# Patient Record
Sex: FEMALE | URBAN - METROPOLITAN AREA
[De-identification: names, ages, dates, MRNs, and addresses within clinical notes are randomized per-mention and may not be internally consistent; named-entity substitution may affect disease eponyms.]

---

## 2023-08-15 ENCOUNTER — APPOINTMENT (RX ONLY)
Dept: URBAN - METROPOLITAN AREA CLINIC 22 | Facility: CLINIC | Age: 73
Setting detail: DERMATOLOGY
End: 2023-08-15

## 2023-08-15 DIAGNOSIS — L82.1 OTHER SEBORRHEIC KERATOSIS: ICD-10-CM

## 2023-08-15 DIAGNOSIS — Z71.89 OTHER SPECIFIED COUNSELING: ICD-10-CM

## 2023-08-15 DIAGNOSIS — K13.21 LEUKOPLAKIA OF ORAL MUCOSA, INCLUDING TONGUE: ICD-10-CM

## 2023-08-15 DIAGNOSIS — D22 MELANOCYTIC NEVI: ICD-10-CM

## 2023-08-15 DIAGNOSIS — L82.0 INFLAMED SEBORRHEIC KERATOSIS: ICD-10-CM

## 2023-08-15 DIAGNOSIS — L81.4 OTHER MELANIN HYPERPIGMENTATION: ICD-10-CM

## 2023-08-15 DIAGNOSIS — D18.0 HEMANGIOMA: ICD-10-CM

## 2023-08-15 DIAGNOSIS — L91.8 OTHER HYPERTROPHIC DISORDERS OF THE SKIN: ICD-10-CM

## 2023-08-15 PROBLEM — D18.01 HEMANGIOMA OF SKIN AND SUBCUTANEOUS TISSUE: Status: ACTIVE | Noted: 2023-08-15

## 2023-08-15 PROBLEM — D22.5 MELANOCYTIC NEVI OF TRUNK: Status: ACTIVE | Noted: 2023-08-15

## 2023-08-15 PROCEDURE — ? LIQUID NITROGEN

## 2023-08-15 PROCEDURE — ? ADDITIONAL NOTES

## 2023-08-15 PROCEDURE — ? SUNSCREEN RECOMMENDATIONS

## 2023-08-15 PROCEDURE — ? COUNSELING

## 2023-08-15 PROCEDURE — 17110 DESTRUCTION B9 LES UP TO 14: CPT

## 2023-08-15 PROCEDURE — 99203 OFFICE O/P NEW LOW 30 MIN: CPT | Mod: 25

## 2023-08-15 ASSESSMENT — LOCATION SIMPLE DESCRIPTION DERM
LOCATION SIMPLE: RIGHT UPPER BACK
LOCATION SIMPLE: RIGHT FOREARM
LOCATION SIMPLE: MONS PUBIS
LOCATION SIMPLE: LEFT SHOULDER
LOCATION SIMPLE: ABDOMEN
LOCATION SIMPLE: LEFT ANTERIOR AXILLA
LOCATION SIMPLE: LEFT UPPER BACK
LOCATION SIMPLE: LEFT LOWER LIP
LOCATION SIMPLE: LEFT POSTERIOR AXILLA
LOCATION SIMPLE: LEFT FOREARM
LOCATION SIMPLE: RIGHT ANTERIOR AXILLA
LOCATION SIMPLE: LEFT AXILLARY VAULT

## 2023-08-15 ASSESSMENT — LOCATION DETAILED DESCRIPTION DERM
LOCATION DETAILED: RIGHT MID-UPPER BACK
LOCATION DETAILED: LEFT INFERIOR VERMILION BORDER
LOCATION DETAILED: LEFT POSTERIOR SHOULDER
LOCATION DETAILED: LEFT INFERIOR UPPER BACK
LOCATION DETAILED: LEFT AXILLARY VAULT
LOCATION DETAILED: RIGHT ANTERIOR AXILLA
LOCATION DETAILED: LEFT POSTERIOR AXILLA
LOCATION DETAILED: LEFT ANTERIOR AXILLA
LOCATION DETAILED: LEFT RIB CAGE
LOCATION DETAILED: LEFT PROXIMAL DORSAL FOREARM
LOCATION DETAILED: RIGHT MEDIAL UPPER BACK
LOCATION DETAILED: LEFT MONS PUBIS
LOCATION DETAILED: LEFT MEDIAL UPPER BACK
LOCATION DETAILED: RIGHT PROXIMAL DORSAL FOREARM
LOCATION DETAILED: LEFT LATERAL ABDOMEN
LOCATION DETAILED: LEFT MID-UPPER BACK

## 2023-08-15 ASSESSMENT — LOCATION ZONE DERM
LOCATION ZONE: AXILLAE
LOCATION ZONE: ARM
LOCATION ZONE: TRUNK
LOCATION ZONE: LIP
LOCATION ZONE: VULVA

## 2023-08-15 NOTE — PROCEDURE: LIQUID NITROGEN
Show Topical Anesthesia Variable?: Yes
Post-Care Instructions: I reviewed with the patient in detail post-care instructions. Patient is to wear sunprotection, and avoid picking at any of the treated lesions. Pt may apply Vaseline to crusted or scabbing areas.
Medical Necessity Information: It is in your best interest to select a reason for this procedure from the list below. All of these items fulfill various CMS LCD requirements except the new and changing color options.
Consent: The patient's mom’s consent was obtained including but not limited to risks of crusting, scabbing, blistering, scarring, darker or lighter pigmentary change, recurrence, incomplete removal and infection.
Spray Paint Technique: No
Spray Paint Text: The liquid nitrogen was applied to the skin utilizing a spray paint frosting technique.
Medical Necessity Clause: This procedure was medically necessary because the lesions that were treated were:
Detail Level: Detailed
Duration Of Freeze Thaw-Cycle (Seconds): 0
Consent: The patient's consent was obtained including but not limited to risks of crusting, scabbing, blistering, scarring, darker or lighter pigmentary change, recurrence, incomplete removal and infection.

## 2023-08-15 NOTE — PROCEDURE: ADDITIONAL NOTES
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  Questioned if she had been also given a dx of lichen sclerosus but states this did not sound familiar. She states it was managed by GYN when she was in [place] and is working on getting a new one here in [facility].  She declines an exam of this area for now however instructed that if she has any concerns we are happy to examine and address this.
Detail Level: Simple
Render Risk Assessment In Note?: no

## 2023-11-06 ENCOUNTER — HOSPITAL ENCOUNTER (OUTPATIENT)
Dept: LAB | Facility: MEDICAL CENTER | Age: 73
End: 2023-11-06
Attending: FAMILY MEDICINE
Payer: COMMERCIAL

## 2023-11-06 LAB
ALBUMIN SERPL BCP-MCNC: 4.2 G/DL (ref 3.2–4.9)
ALBUMIN/GLOB SERPL: 1.6 G/DL
ALP SERPL-CCNC: 108 U/L (ref 30–99)
ALT SERPL-CCNC: 12 U/L (ref 2–50)
ANION GAP SERPL CALC-SCNC: 8 MMOL/L (ref 7–16)
AST SERPL-CCNC: 13 U/L (ref 12–45)
BASOPHILS # BLD AUTO: 0.3 % (ref 0–1.8)
BASOPHILS # BLD: 0.02 K/UL (ref 0–0.12)
BILIRUB SERPL-MCNC: 0.3 MG/DL (ref 0.1–1.5)
BUN SERPL-MCNC: 22 MG/DL (ref 8–22)
CALCIUM ALBUM COR SERPL-MCNC: 9.2 MG/DL (ref 8.5–10.5)
CALCIUM SERPL-MCNC: 9.4 MG/DL (ref 8.5–10.5)
CHLORIDE SERPL-SCNC: 103 MMOL/L (ref 96–112)
CHOLEST SERPL-MCNC: 180 MG/DL (ref 100–199)
CO2 SERPL-SCNC: 29 MMOL/L (ref 20–33)
CREAT SERPL-MCNC: 0.76 MG/DL (ref 0.5–1.4)
EOSINOPHIL # BLD AUTO: 0.18 K/UL (ref 0–0.51)
EOSINOPHIL NFR BLD: 3 % (ref 0–6.9)
ERYTHROCYTE [DISTWIDTH] IN BLOOD BY AUTOMATED COUNT: 41.1 FL (ref 35.9–50)
FASTING STATUS PATIENT QL REPORTED: NORMAL
GFR SERPLBLD CREATININE-BSD FMLA CKD-EPI: 84 ML/MIN/1.73 M 2
GLOBULIN SER CALC-MCNC: 2.7 G/DL (ref 1.9–3.5)
GLUCOSE SERPL-MCNC: 97 MG/DL (ref 65–99)
HCT VFR BLD AUTO: 45.5 % (ref 37–47)
HDLC SERPL-MCNC: 63 MG/DL
HGB BLD-MCNC: 15.2 G/DL (ref 12–16)
IMM GRANULOCYTES # BLD AUTO: 0.03 K/UL (ref 0–0.11)
IMM GRANULOCYTES NFR BLD AUTO: 0.5 % (ref 0–0.9)
LDLC SERPL CALC-MCNC: 93 MG/DL
LYMPHOCYTES # BLD AUTO: 2.05 K/UL (ref 1–4.8)
LYMPHOCYTES NFR BLD: 33.8 % (ref 22–41)
MCH RBC QN AUTO: 30.5 PG (ref 27–33)
MCHC RBC AUTO-ENTMCNC: 33.4 G/DL (ref 32.2–35.5)
MCV RBC AUTO: 91.2 FL (ref 81.4–97.8)
MONOCYTES # BLD AUTO: 0.41 K/UL (ref 0–0.85)
MONOCYTES NFR BLD AUTO: 6.8 % (ref 0–13.4)
NEUTROPHILS # BLD AUTO: 3.37 K/UL (ref 1.82–7.42)
NEUTROPHILS NFR BLD: 55.6 % (ref 44–72)
NRBC # BLD AUTO: 0 K/UL
NRBC BLD-RTO: 0 /100 WBC (ref 0–0.2)
PLATELET # BLD AUTO: 310 K/UL (ref 164–446)
PMV BLD AUTO: 8.7 FL (ref 9–12.9)
POTASSIUM SERPL-SCNC: 4.3 MMOL/L (ref 3.6–5.5)
PROT SERPL-MCNC: 6.9 G/DL (ref 6–8.2)
RBC # BLD AUTO: 4.99 M/UL (ref 4.2–5.4)
SODIUM SERPL-SCNC: 140 MMOL/L (ref 135–145)
TRIGL SERPL-MCNC: 120 MG/DL (ref 0–149)
WBC # BLD AUTO: 6.1 K/UL (ref 4.8–10.8)

## 2023-11-06 PROCEDURE — 85025 COMPLETE CBC W/AUTO DIFF WBC: CPT

## 2023-11-06 PROCEDURE — 80053 COMPREHEN METABOLIC PANEL: CPT

## 2023-11-06 PROCEDURE — 36415 COLL VENOUS BLD VENIPUNCTURE: CPT

## 2023-11-06 PROCEDURE — 80061 LIPID PANEL: CPT

## 2024-03-27 ENCOUNTER — OFFICE VISIT (OUTPATIENT)
Dept: MEDICAL GROUP | Facility: PHYSICIAN GROUP | Age: 74
End: 2024-03-27
Payer: MEDICARE

## 2024-03-27 ENCOUNTER — HOSPITAL ENCOUNTER (OUTPATIENT)
Dept: LAB | Facility: MEDICAL CENTER | Age: 74
End: 2024-03-27
Attending: STUDENT IN AN ORGANIZED HEALTH CARE EDUCATION/TRAINING PROGRAM
Payer: MEDICARE

## 2024-03-27 VITALS
HEART RATE: 62 BPM | WEIGHT: 151 LBS | OXYGEN SATURATION: 97 % | BODY MASS INDEX: 27.79 KG/M2 | HEIGHT: 62 IN | TEMPERATURE: 97.7 F | DIASTOLIC BLOOD PRESSURE: 70 MMHG | SYSTOLIC BLOOD PRESSURE: 120 MMHG

## 2024-03-27 DIAGNOSIS — K63.5 BENIGN COLONIC POLYP: ICD-10-CM

## 2024-03-27 DIAGNOSIS — I10 ESSENTIAL HYPERTENSION: ICD-10-CM

## 2024-03-27 DIAGNOSIS — Z12.11 COLON CANCER SCREENING: ICD-10-CM

## 2024-03-27 DIAGNOSIS — M85.80 OSTEOPENIA AFTER MENOPAUSE: ICD-10-CM

## 2024-03-27 DIAGNOSIS — Z76.89 ENCOUNTER TO ESTABLISH CARE: ICD-10-CM

## 2024-03-27 DIAGNOSIS — L40.9 PSORIASIS: ICD-10-CM

## 2024-03-27 DIAGNOSIS — H93.A3 PULSATILE TINNITUS OF BOTH EARS: ICD-10-CM

## 2024-03-27 DIAGNOSIS — H93.13 TINNITUS, BILATERAL: ICD-10-CM

## 2024-03-27 DIAGNOSIS — N90.4 LEUKOPLAKIA OF VULVA: ICD-10-CM

## 2024-03-27 DIAGNOSIS — G44.89 OTHER HEADACHE SYNDROME: ICD-10-CM

## 2024-03-27 DIAGNOSIS — I45.10 RBBB: ICD-10-CM

## 2024-03-27 DIAGNOSIS — Z78.0 OSTEOPENIA AFTER MENOPAUSE: ICD-10-CM

## 2024-03-27 DIAGNOSIS — M25.532 LEFT WRIST PAIN: ICD-10-CM

## 2024-03-27 PROBLEM — H69.91 ETD (EUSTACHIAN TUBE DYSFUNCTION), RIGHT: Status: RESOLVED | Noted: 2022-04-01 | Resolved: 2024-03-27

## 2024-03-27 PROBLEM — M89.319 CLAVICLE ENLARGEMENT: Status: ACTIVE | Noted: 2022-04-01

## 2024-03-27 PROBLEM — H93.A2 PULSATILE TINNITUS OF LEFT EAR: Status: ACTIVE | Noted: 2024-03-27

## 2024-03-27 PROBLEM — H69.91 ETD (EUSTACHIAN TUBE DYSFUNCTION), RIGHT: Status: ACTIVE | Noted: 2022-04-01

## 2024-03-27 PROBLEM — M89.319 CLAVICLE ENLARGEMENT: Status: RESOLVED | Noted: 2022-04-01 | Resolved: 2024-03-27

## 2024-03-27 PROCEDURE — 80048 BASIC METABOLIC PNL TOTAL CA: CPT

## 2024-03-27 PROCEDURE — 36415 COLL VENOUS BLD VENIPUNCTURE: CPT

## 2024-03-27 RX ORDER — CLOBETASOL PROPIONATE 0.5 MG/G
1 CREAM TOPICAL 2 TIMES DAILY
COMMUNITY

## 2024-03-27 RX ORDER — VALSARTAN AND HYDROCHLOROTHIAZIDE 80; 12.5 MG/1; MG/1
1 TABLET, FILM COATED ORAL
COMMUNITY
Start: 2024-01-15

## 2024-03-27 ASSESSMENT — ENCOUNTER SYMPTOMS
SHORTNESS OF BREATH: 0
FEVER: 0
PALPITATIONS: 0
CHILLS: 0

## 2024-03-27 ASSESSMENT — PATIENT HEALTH QUESTIONNAIRE - PHQ9: CLINICAL INTERPRETATION OF PHQ2 SCORE: 0

## 2024-03-27 ASSESSMENT — FIBROSIS 4 INDEX: FIB4 SCORE: 0.86

## 2024-03-27 NOTE — LETTER
[facility]  Iman Novak M.D.  [address] [place] [address]  Fax: [phone]   Authorization for Release/Disclosure of   Protected Health Information   Name: MALCOM VALLES : 1950 SSN: [id number]   Address: [facility] [place] [address] Phone:    [phone] (home)    I authorize the entity listed below to release/disclose the PHI below to:   [facility]/Iman Novak M.D. and Iman Novak M.D.   Provider or Entity Name:  [facility] Medicine: Yuniel Guy MD   Address   [facility], [place]   [address], [place] [address] Phone:  [phone]    Fax:     Reason for request: continuity of care   Information to be released:    [  ] LAST COLONOSCOPY,  including any PATH REPORT and follow-up  [  ] LAST FIT/COLOGUARD RESULT [  ] LAST DEXA  [  ] LAST MAMMOGRAM  [  ] LAST PAP  [  ] LAST LABS [  ] RETINA EXAM REPORT  [  ] IMMUNIZATION RECORDS  [xxxxx] Release all info      [  ] Check here and initial the line next to each item to release ALL health information INCLUDING  _____ Care and treatment for drug and / or alcohol abuse  _____ HIV testing, infection status, or AIDS  _____ Genetic Testing    DATES OF SERVICE OR TIME PERIOD TO BE DISCLOSED: _____________  I understand and acknowledge that:  * This Authorization may be revoked at any time by you in writing, except if your health information has already been used or disclosed.  * Your health information that will be used or disclosed as a result of you signing this authorization could be re-disclosed by the recipient. If this occurs, your re-disclosed health information may no longer be protected by State or Federal laws.  * You may refuse to sign this Authorization. Your refusal will not affect your ability to obtain treatment.  * This Authorization becomes effective upon signing and will  on (date) __________.      If no date is indicated, this Authorization will  one (1) year from the signature date.    Name:  Christa Blankenship  Signature: Date:   3/27/2024     PLEASE FAX REQUESTED RECORDS BACK TO: [phone]

## 2024-03-27 NOTE — ASSESSMENT & PLAN NOTE
Chronic, patient was previously established with Cardiology and was following with them annually. She denies chest pain and dyspnea.

## 2024-03-27 NOTE — ASSESSMENT & PLAN NOTE
Patient reports that about three months ago she noticed as sensation of aching in her left occipital region. Episodes are intermittent and last about five minutes. She feels as if there is bruise there. She denies numbness/tingling.  She does not wake up with headaches, denies nausea/vomiting, no changes in the vision. Laying on her left side and touching the area can cause the sensation. Family history of cerebellar hemorrhage in father.

## 2024-03-27 NOTE — ASSESSMENT & PLAN NOTE
Patient reports that this has been ongoing for the past three months. She will hear her heart beating when laying on her side.

## 2024-03-27 NOTE — PROGRESS NOTES
Subjective:     CC:  Diagnoses of Encounter to establish care, Other headache syndrome, Tinnitus, bilateral, Pulsatile tinnitus of both ears, Essential hypertension, RBBB, Psoriasis, Osteopenia after menopause, Leukoplakia of vulva, Left wrist pain, Benign colonic polyp, and Colon cancer screening were pertinent to this visit.    HISTORY OF THE PRESENT ILLNESS: Patient is a 73 y.o. female. This pleasant patient is here today to establish care.    Essential hypertension  Chronic, controlled. Patient currently on Diovan-HCT 80-12.5mg qd.    Psoriasis  Chronic, stable. Patient is established with dermatology.     Leukoplakia of vulva  Chronic, stable. Patient takes Clobetasol propionate cream 0.05%.     RBBB  Chronic, patient was previously established with Cardiology and was following with them annually. She denies chest pain and dyspnea.     Benign colonic polyp  Patient last had a colonoscopy in 5/2019 with one polyp removed.     Osteopenia after menopause  Patient last had a dexa scan in 2021 with findings of osteopenia.     Other headache syndrome  Patient reports that about three months ago she noticed as sensation of aching in her left occipital region. Episodes are intermittent and last about five minutes. She feels as if there is bruise there. She denies numbness/tingling.  She does not wake up with headaches, denies nausea/vomiting, no changes in the vision. Laying on her left side and touching the area can cause the sensation. Family history of cerebellar hemorrhage in father.     Pulsatile tinnitus of both ears  Patient reports that this has been ongoing for the past three months. She will hear her heart beating when laying on her side.     Left wrist pain  Patient reports having left wrist pain that began about two weeks ago. She has been wearing a brace with some relief. She has been applying ice and resting. She reports the pain is improving however she is still unable to  her grandchildern. She  "has a history of left wrist cyst removal in 10/2020 and trigger thumb release in 10/2019. Patient requesting referral to Ortho.       Patient Active Problem List    Diagnosis Date Noted    Psoriasis 03/27/2024    Leukoplakia of vulva 03/27/2024    Other headache syndrome 03/27/2024    Pulsatile tinnitus of both ears 03/27/2024    Left wrist pain 03/27/2024    Osteopenia after menopause 09/02/2021    Essential hypertension 10/20/2020    RBBB 10/20/2020    Benign colonic polyp 12/03/2007       Health Maintenance: Completed  -Records requested from patient previous PCP    ROS:   Review of Systems   Constitutional:  Negative for chills and fever.   Respiratory:  Negative for shortness of breath.    Cardiovascular:  Negative for chest pain and palpitations.         Objective:     Exam: /70 (BP Location: Right arm, Patient Position: Sitting, BP Cuff Size: Adult)   Pulse 62   Temp 36.5 °C (97.7 °F) (Temporal)   Ht 1.575 m (5' 2\")   Wt 68.5 kg (151 lb)   SpO2 97%  Body mass index is 27.62 kg/m².    Physical Exam  Constitutional:       Appearance: Normal appearance.   Cardiovascular:      Rate and Rhythm: Normal rate and regular rhythm.      Heart sounds: Normal heart sounds.   Pulmonary:      Effort: Pulmonary effort is normal. No respiratory distress.      Breath sounds: Normal breath sounds. No stridor. No wheezing, rhonchi or rales.   Neurological:      General: No focal deficit present.      Mental Status: She is alert.      Cranial Nerves: Cranial nerves 2-12 are intact.      Sensory: Sensation is intact.      Motor: Motor function is intact.      Coordination: Coordination is intact.      Gait: Gait is intact.           Assessment & Plan:   73 y.o. female with the following -    1. Encounter to establish care  Patient presents today to establish care. Chart was reviewed and history was discussed in detail with the patient.  Records requested from patient's previous PCP.  Referral for colonoscopy " placed.    2. Other headache syndrome  Chronic, ongoing for the past 3 months. Unclear etiology at this time, symptoms could possibly be related to bilateral pulsatile tinnitus.  Neuroexam wnl.  MRI brain with and without contrast ordered.    3. Tinnitus, bilateral  4. Pulsatile tinnitus of both ears  Chronic, ongoing for the past 3 months.  Will obtain MR brain with and without contrast and BMP.  Referral for ENT placed.  - MR-BRAIN-WITH & W/O; Future  - Basic Metabolic Panel; Future  - Referral to ENT    5. Essential hypertension  Chronic, controlled.  Blood pressure is at goal under 140/90 in the office today. Continue Diovan-HCT 80-12.5 mg qd.    6. RBBB  Chronic, stable condition.  Patient previously established with cardiology.  At the next visit we will place referral for cardiology.    7. Psoriasis  Chronic, stable condition.  Continue Clobetasol cream PRN.    8. Osteopenia after menopause  Chronic, stable condition.  DEXA scan from 9/2021 will provide order for DEXA scan at the next visit.    9. Leukoplakia of vulva  Chronic, stable condition.  Continue clobetasol cream PRN.    10. Left wrist pain  Chronic, ongoing.  Patient requesting referral to orthopedics.  Referral placed today.  - Referral to Orthopedics    11. Benign colonic polyp  12. Colon cancer screening  - Referral to GI for Colonoscopy          Return in about 8 weeks (around 5/22/2024) for Chronic Conditions.    Please note that this dictation was created using voice recognition software. I have made every reasonable attempt to correct obvious errors, but I expect that there are errors of grammar and possibly content that I did not discover before finalizing the note.

## 2024-03-27 NOTE — ASSESSMENT & PLAN NOTE
Patient reports having left wrist pain that began about two weeks ago. She has been wearing a brace with some relief. She has been applying ice and resting. She reports the pain is improving however she is still unable to  her grandchildern. She has a history of left wrist cyst removal in 10/2020 and trigger thumb release in 10/2019. Patient requesting referral to Ortho.

## 2024-03-28 LAB
ANION GAP SERPL CALC-SCNC: 11 MMOL/L (ref 7–16)
BUN SERPL-MCNC: 19 MG/DL (ref 8–22)
CALCIUM SERPL-MCNC: 9.4 MG/DL (ref 8.5–10.5)
CHLORIDE SERPL-SCNC: 101 MMOL/L (ref 96–112)
CO2 SERPL-SCNC: 27 MMOL/L (ref 20–33)
CREAT SERPL-MCNC: 0.65 MG/DL (ref 0.5–1.4)
GFR SERPLBLD CREATININE-BSD FMLA CKD-EPI: 94 ML/MIN/1.73 M 2
GLUCOSE SERPL-MCNC: 91 MG/DL (ref 65–99)
POTASSIUM SERPL-SCNC: 4.8 MMOL/L (ref 3.6–5.5)
SODIUM SERPL-SCNC: 139 MMOL/L (ref 135–145)

## 2024-04-23 RX ORDER — VALSARTAN AND HYDROCHLOROTHIAZIDE 80; 12.5 MG/1; MG/1
1 TABLET, FILM COATED ORAL
Qty: 90 TABLET | Refills: 0 | Status: SHIPPED | OUTPATIENT
Start: 2024-04-23

## 2024-04-23 NOTE — TELEPHONE ENCOUNTER
Requested Prescriptions     Pending Prescriptions Disp Refills    valsartan-hydrochlorothiazide (DIOVAN-HCT) 80-12.5 MG per tablet 90 Tablet 0     Sig: Take 1 Tablet by mouth every day.        Statement Selected

## 2024-04-23 NOTE — TELEPHONE ENCOUNTER
Received request via: Patient    Was the patient seen in the last year in this department? Yes    Does the patient have an active prescription (recently filled or refills available) for medication(s) requested? No    [facility]    Does the patient have [facility] and need 100 day supply (blood pressure, diabetes and cholesterol meds only)? Patient does not have SCP

## 2024-05-21 ENCOUNTER — OFFICE VISIT (OUTPATIENT)
Dept: MEDICAL GROUP | Facility: PHYSICIAN GROUP | Age: 74
End: 2024-05-21
Payer: MEDICARE

## 2024-05-21 VITALS
HEART RATE: 97 BPM | HEIGHT: 62 IN | TEMPERATURE: 98 F | BODY MASS INDEX: 27.6 KG/M2 | OXYGEN SATURATION: 97 % | DIASTOLIC BLOOD PRESSURE: 72 MMHG | WEIGHT: 150 LBS | SYSTOLIC BLOOD PRESSURE: 120 MMHG

## 2024-05-21 DIAGNOSIS — I10 ESSENTIAL HYPERTENSION: ICD-10-CM

## 2024-05-21 DIAGNOSIS — N90.4 LEUKOPLAKIA OF VULVA: ICD-10-CM

## 2024-05-21 DIAGNOSIS — L40.9 PSORIASIS: ICD-10-CM

## 2024-05-21 DIAGNOSIS — K63.5 POLYP OF SIGMOID COLON, UNSPECIFIED TYPE: ICD-10-CM

## 2024-05-21 DIAGNOSIS — M19.032 PRIMARY OSTEOARTHRITIS OF LEFT WRIST: ICD-10-CM

## 2024-05-21 DIAGNOSIS — H93.A3 PULSATILE TINNITUS OF BOTH EARS: ICD-10-CM

## 2024-05-21 PROCEDURE — 3074F SYST BP LT 130 MM HG: CPT | Performed by: STUDENT IN AN ORGANIZED HEALTH CARE EDUCATION/TRAINING PROGRAM

## 2024-05-21 PROCEDURE — 3078F DIAST BP <80 MM HG: CPT | Performed by: STUDENT IN AN ORGANIZED HEALTH CARE EDUCATION/TRAINING PROGRAM

## 2024-05-21 PROCEDURE — 99214 OFFICE O/P EST MOD 30 MIN: CPT | Performed by: STUDENT IN AN ORGANIZED HEALTH CARE EDUCATION/TRAINING PROGRAM

## 2024-05-21 RX ORDER — CLOBETASOL PROPIONATE 0.5 MG/G
1 CREAM TOPICAL 2 TIMES DAILY
Qty: 60 G | Refills: 3 | Status: SHIPPED | OUTPATIENT
Start: 2024-05-21

## 2024-05-21 ASSESSMENT — ENCOUNTER SYMPTOMS
PALPITATIONS: 0
CHILLS: 0
FEVER: 0
SHORTNESS OF BREATH: 0

## 2024-05-21 ASSESSMENT — FIBROSIS 4 INDEX: FIB4 SCORE: 0.86

## 2024-05-21 NOTE — PROGRESS NOTES
"Subjective:     CC: Follow-up    HPI:   Ms. Blankenship is a pleasant 73-year-old who presents today for follow-up visit.    In-between visits patient was able to establish with orthopedics.  She was told that she has arthritis of the left wrist and received a steroid injection with much relief.  She states her orthopedist also ordered meloxicam 7.5 mg daily, she reports that she has not started taking the medication.    Patient reports she also was able to have her colonoscopy completed and would like to discuss the results.    She states that she did not schedule an appointment with ENT and she canceled her MRI due to the bilateral tinnitus improving.  She states that if her symptoms return and worsen she will go ahead and schedule both appointments.      Patient Active Problem List    Diagnosis Date Noted    Primary osteoarthritis of left wrist 05/21/2024    Psoriasis 03/27/2024    Leukoplakia of vulva 03/27/2024    Other headache syndrome 03/27/2024    Pulsatile tinnitus of both ears 03/27/2024    Left wrist pain 03/27/2024    Osteopenia after menopause 09/02/2021    Essential hypertension 10/20/2020    RBBB 10/20/2020    Benign colonic polyp 12/03/2007     Health Maintenance: Completed    ROS:  Review of Systems   Constitutional:  Negative for chills and fever.   Respiratory:  Negative for shortness of breath.    Cardiovascular:  Negative for chest pain and palpitations.       Objective:     Exam:  /72 (BP Location: Left arm, Patient Position: Sitting, BP Cuff Size: Adult)   Pulse 97   Temp 36.7 °C (98 °F) (Temporal)   Ht 1.575 m (5' 2\")   Wt 68 kg (150 lb)   SpO2 97%   BMI 27.44 kg/m²  Body mass index is 27.44 kg/m².    Physical Exam  Constitutional:       Appearance: Normal appearance.   Eyes:      Extraocular Movements: Extraocular movements intact.   Neurological:      Mental Status: She is alert.   Psychiatric:         Mood and Affect: Mood normal.           Assessment & Plan:     73 y.o. female " with the following -     1. Essential hypertension  Chronic, controlled.  Blood pressure at goal today.  Continue valsartan-hydrochlorothiazide 80-12.5 mg daily.    2. Primary osteoarthritis of left wrist  Chronic, stable.  Patient diagnosed with osteoarthritis of the left wrist through Ortho.  She has received a steroid injection to the left wrist with much improvement.  Continue meloxicam 7.5 mg as needed for pain.    3. Polyp of sigmoid colon, unspecified type  Patient is status post colonoscopy on 5/14/2024 with removal of a single sessile 2 mm polyp from the cecum and two sessile polyps in size from 2 mm to 4 mm were found in the sigmoid colon.  Pathology results.  Per colonoscopy summary recall colonoscopy planned for 5 years.    4. Leukoplakia of vulva  5. Psoriasis  - clobetasol (TEMOVATE) 0.05 % Cream; Apply 1 Application topically 2 times a day.  Dispense: 60 g; Refill: 3    6. Pulsatile tinnitus of both ears  Chronic, improving.  Patient states that due to her symptoms improving she canceled her MRI appointment and has yet to schedule with ENT.  She reports that if her symptoms begin to worsen she will schedule both appointments.  Will monitor closely.            Return in about 6 months (around 11/21/2024) for Annual.    Please note that this dictation was created using voice recognition software. I have made every reasonable attempt to correct obvious errors, but I expect that there are errors of grammar and possibly content that I did not discover before finalizing the note.

## 2024-07-29 RX ORDER — VALSARTAN AND HYDROCHLOROTHIAZIDE 80; 12.5 MG/1; MG/1
1 TABLET, FILM COATED ORAL
Qty: 90 TABLET | Refills: 0 | Status: SHIPPED | OUTPATIENT
Start: 2024-07-29

## 2024-11-07 ENCOUNTER — HOSPITAL ENCOUNTER (OUTPATIENT)
Dept: RADIOLOGY | Facility: MEDICAL CENTER | Age: 74
End: 2024-11-07
Attending: STUDENT IN AN ORGANIZED HEALTH CARE EDUCATION/TRAINING PROGRAM
Payer: MEDICARE

## 2024-11-07 DIAGNOSIS — H93.13 TINNITUS, BILATERAL: ICD-10-CM

## 2024-11-07 DIAGNOSIS — H93.A3 PULSATILE TINNITUS OF BOTH EARS: ICD-10-CM

## 2024-11-07 PROCEDURE — A9579 GAD-BASE MR CONTRAST NOS,1ML: HCPCS | Mod: JZ | Performed by: STUDENT IN AN ORGANIZED HEALTH CARE EDUCATION/TRAINING PROGRAM

## 2024-11-07 PROCEDURE — 700117 HCHG RX CONTRAST REV CODE 255: Mod: JZ | Performed by: STUDENT IN AN ORGANIZED HEALTH CARE EDUCATION/TRAINING PROGRAM

## 2024-11-07 PROCEDURE — 700102 HCHG RX REV CODE 250 W/ 637 OVERRIDE(OP): Performed by: RADIOLOGY

## 2024-11-07 PROCEDURE — 70553 MRI BRAIN STEM W/O & W/DYE: CPT

## 2024-11-07 PROCEDURE — A9270 NON-COVERED ITEM OR SERVICE: HCPCS | Performed by: RADIOLOGY

## 2024-11-07 RX ORDER — DIAZEPAM 5 MG/1
5 TABLET ORAL
Status: COMPLETED | OUTPATIENT
Start: 2024-11-07 | End: 2024-11-07

## 2024-11-07 RX ADMIN — GADOTERIDOL 15 ML: 279.3 INJECTION, SOLUTION INTRAVENOUS at 13:45

## 2024-11-07 RX ADMIN — DIAZEPAM 5 MG: 5 TABLET ORAL at 12:23

## 2024-11-07 NOTE — DISCHARGE INSTRUCTIONS
MRI ADULT DISCHARGE INSTRUCTIONS    You have been medicated today for your scan. Please follow the instructions below to ensure your safe recovery. If you have any questions or problems, feel free to call us at [phone] or [phone].     1.   Have someone stay with you to assist you as needed.    2.   Do not drive or operate any mechanical devices.    3.   Do not perform any activity that requires concentration. Make no major decisions over the next 24 hours.     4.   Be careful changing positions from laying down to sitting or standing, as you may become dizzy.     5.   Do not drink alcohol for 48 hours.    6.   There are no restrictions for eating your normal meals. Drink fluids.    7.   You may continue your usual medications for pain, tranquilizers, muscle relaxants or sedatives when awake.     8.   Tomorrow, you may continue your normal daily activities.     9.   Pressure dressing on 10 - 15 minutes. If swelling or bleeding occurs when removed, continue placing direct pressure on injection site for another 5 minutes, or until bleeding stops.     I have been informed of and understand the above discharge instructions. Diazepam (VALIUM) Oral solution  What is this medicine?  You were prescribed DIAZEPAM (dye AZ e tyrell) for the procedure you had today. This medication is a benzodiazepine. It is used to treat anxiety and nervousness. It also can help treat alcohol withdrawal, relax muscles, and treat certain types of seizures.  This medicine may be used for other purposes; ask your health care provider or pharmacist if you have questions.  What side effects may I notice from receiving this medicine?  Side effects that you should report to your doctor or health care professional as soon as possible:  allergic reactions like skin rash, itching or hives, swelling of the face, lips, or tongue  angry, confused, depressed, other mood changes  breathing problems  feeling faint or lightheaded, falls  muscle cramps  problems  with balance, talking, walking  restlessness  tremors  trouble passing urine or change in the amount of urine  unusually weak or tired  Side effects that usually do not require medical attention (report to your doctor or health care professional if they continue or are bothersome):  difficulty sleeping, nightmares  dizziness, drowsiness, clumsiness, or unsteadiness, a hangover effect  headache  nausea, vomiting  This list may not describe all possible side effects. Call your doctor for medical advice about side effects. You may report side effects to FDA at 0-713-ATU-2004.

## 2024-11-12 SDOH — ECONOMIC STABILITY: INCOME INSECURITY: HOW HARD IS IT FOR YOU TO PAY FOR THE VERY BASICS LIKE FOOD, HOUSING, MEDICAL CARE, AND HEATING?: NOT VERY HARD

## 2024-11-12 SDOH — ECONOMIC STABILITY: FOOD INSECURITY: WITHIN THE PAST 12 MONTHS, YOU WORRIED THAT YOUR FOOD WOULD RUN OUT BEFORE YOU GOT MONEY TO BUY MORE.: NEVER TRUE

## 2024-11-12 SDOH — ECONOMIC STABILITY: INCOME INSECURITY: IN THE LAST 12 MONTHS, WAS THERE A TIME WHEN YOU WERE NOT ABLE TO PAY THE MORTGAGE OR RENT ON TIME?: NO

## 2024-11-12 SDOH — ECONOMIC STABILITY: FOOD INSECURITY: WITHIN THE PAST 12 MONTHS, THE FOOD YOU BOUGHT JUST DIDN'T LAST AND YOU DIDN'T HAVE MONEY TO GET MORE.: NEVER TRUE

## 2024-11-12 SDOH — HEALTH STABILITY: PHYSICAL HEALTH: ON AVERAGE, HOW MANY MINUTES DO YOU ENGAGE IN EXERCISE AT THIS LEVEL?: 50 MIN

## 2024-11-12 SDOH — ECONOMIC STABILITY: TRANSPORTATION INSECURITY
IN THE PAST 12 MONTHS, HAS THE LACK OF TRANSPORTATION KEPT YOU FROM MEDICAL APPOINTMENTS OR FROM GETTING MEDICATIONS?: NO

## 2024-11-12 SDOH — ECONOMIC STABILITY: TRANSPORTATION INSECURITY
IN THE PAST 12 MONTHS, HAS LACK OF RELIABLE TRANSPORTATION KEPT YOU FROM MEDICAL APPOINTMENTS, MEETINGS, WORK OR FROM GETTING THINGS NEEDED FOR DAILY LIVING?: NO

## 2024-11-12 SDOH — ECONOMIC STABILITY: TRANSPORTATION INSECURITY
IN THE PAST 12 MONTHS, HAS LACK OF TRANSPORTATION KEPT YOU FROM MEETINGS, WORK, OR FROM GETTING THINGS NEEDED FOR DAILY LIVING?: NO

## 2024-11-12 SDOH — HEALTH STABILITY: PHYSICAL HEALTH: ON AVERAGE, HOW MANY DAYS PER WEEK DO YOU ENGAGE IN MODERATE TO STRENUOUS EXERCISE (LIKE A BRISK WALK)?: 2 DAYS

## 2024-11-12 SDOH — ECONOMIC STABILITY: HOUSING INSECURITY
IN THE LAST 12 MONTHS, WAS THERE A TIME WHEN YOU DID NOT HAVE A STEADY PLACE TO SLEEP OR SLEPT IN A SHELTER (INCLUDING NOW)?: NO

## 2024-11-12 SDOH — HEALTH STABILITY: MENTAL HEALTH
STRESS IS WHEN SOMEONE FEELS TENSE, NERVOUS, ANXIOUS, OR CAN'T SLEEP AT NIGHT BECAUSE THEIR MIND IS TROUBLED. HOW STRESSED ARE YOU?: TO SOME EXTENT

## 2024-11-12 ASSESSMENT — SOCIAL DETERMINANTS OF HEALTH (SDOH)
IN THE PAST 12 MONTHS, HAS THE ELECTRIC, GAS, OIL, OR WATER COMPANY THREATENED TO SHUT OFF SERVICE IN YOUR HOME?: NO
HOW MANY DRINKS CONTAINING ALCOHOL DO YOU HAVE ON A TYPICAL DAY WHEN YOU ARE DRINKING: PATIENT DOES NOT DRINK
HOW OFTEN DO YOU ATTENT MEETINGS OF THE CLUB OR ORGANIZATION YOU BELONG TO?: NEVER
DO YOU BELONG TO ANY CLUBS OR ORGANIZATIONS SUCH AS CHURCH GROUPS UNIONS, FRATERNAL OR ATHLETIC GROUPS, OR SCHOOL GROUPS?: YES
IN A TYPICAL WEEK, HOW MANY TIMES DO YOU TALK ON THE PHONE WITH FAMILY, FRIENDS, OR NEIGHBORS?: MORE THAN THREE TIMES A WEEK
HOW HARD IS IT FOR YOU TO PAY FOR THE VERY BASICS LIKE FOOD, HOUSING, MEDICAL CARE, AND HEATING?: NOT VERY HARD
HOW OFTEN DO YOU GET TOGETHER WITH FRIENDS OR RELATIVES?: TWICE A WEEK
HOW OFTEN DO YOU HAVE SIX OR MORE DRINKS ON ONE OCCASION: NEVER
HOW OFTEN DO YOU ATTENT MEETINGS OF THE CLUB OR ORGANIZATION YOU BELONG TO?: NEVER
IN A TYPICAL WEEK, HOW MANY TIMES DO YOU TALK ON THE PHONE WITH FAMILY, FRIENDS, OR NEIGHBORS?: MORE THAN THREE TIMES A WEEK
WITHIN THE PAST 12 MONTHS, YOU WORRIED THAT YOUR FOOD WOULD RUN OUT BEFORE YOU GOT THE MONEY TO BUY MORE: NEVER TRUE
HOW OFTEN DO YOU HAVE A DRINK CONTAINING ALCOHOL: NEVER
DO YOU BELONG TO ANY CLUBS OR ORGANIZATIONS SUCH AS CHURCH GROUPS UNIONS, FRATERNAL OR ATHLETIC GROUPS, OR SCHOOL GROUPS?: YES
HOW OFTEN DO YOU ATTEND CHURCH OR RELIGIOUS SERVICES?: MORE THAN 4 TIMES PER YEAR
HOW OFTEN DO YOU GET TOGETHER WITH FRIENDS OR RELATIVES?: TWICE A WEEK
HOW OFTEN DO YOU ATTEND CHURCH OR RELIGIOUS SERVICES?: MORE THAN 4 TIMES PER YEAR

## 2024-11-12 ASSESSMENT — LIFESTYLE VARIABLES
HOW OFTEN DO YOU HAVE SIX OR MORE DRINKS ON ONE OCCASION: NEVER
AUDIT-C TOTAL SCORE: 0
HOW OFTEN DO YOU HAVE A DRINK CONTAINING ALCOHOL: NEVER
SKIP TO QUESTIONS 9-10: 1
HOW MANY STANDARD DRINKS CONTAINING ALCOHOL DO YOU HAVE ON A TYPICAL DAY: PATIENT DOES NOT DRINK

## 2024-11-15 ENCOUNTER — OFFICE VISIT (OUTPATIENT)
Dept: MEDICAL GROUP | Facility: PHYSICIAN GROUP | Age: 74
End: 2024-11-15
Payer: MEDICARE

## 2024-11-15 VITALS
DIASTOLIC BLOOD PRESSURE: 72 MMHG | WEIGHT: 150 LBS | HEIGHT: 62 IN | OXYGEN SATURATION: 96 % | TEMPERATURE: 97.9 F | HEART RATE: 74 BPM | SYSTOLIC BLOOD PRESSURE: 128 MMHG | BODY MASS INDEX: 27.6 KG/M2

## 2024-11-15 DIAGNOSIS — M19.032 PRIMARY OSTEOARTHRITIS OF LEFT WRIST: ICD-10-CM

## 2024-11-15 DIAGNOSIS — H93.A3 PULSATILE TINNITUS OF BOTH EARS: ICD-10-CM

## 2024-11-15 DIAGNOSIS — I10 ESSENTIAL HYPERTENSION: ICD-10-CM

## 2024-11-15 DIAGNOSIS — H93.A2 PULSATILE TINNITUS OF LEFT EAR: ICD-10-CM

## 2024-11-15 PROCEDURE — 3074F SYST BP LT 130 MM HG: CPT | Performed by: STUDENT IN AN ORGANIZED HEALTH CARE EDUCATION/TRAINING PROGRAM

## 2024-11-15 PROCEDURE — 3078F DIAST BP <80 MM HG: CPT | Performed by: STUDENT IN AN ORGANIZED HEALTH CARE EDUCATION/TRAINING PROGRAM

## 2024-11-15 PROCEDURE — 99214 OFFICE O/P EST MOD 30 MIN: CPT | Performed by: STUDENT IN AN ORGANIZED HEALTH CARE EDUCATION/TRAINING PROGRAM

## 2024-11-15 RX ORDER — VALSARTAN AND HYDROCHLOROTHIAZIDE 80; 12.5 MG/1; MG/1
1 TABLET, FILM COATED ORAL
Qty: 90 TABLET | Refills: 3 | Status: SHIPPED | OUTPATIENT
Start: 2024-11-15

## 2024-11-15 ASSESSMENT — FIBROSIS 4 INDEX: FIB4 SCORE: 0.86

## 2024-11-15 ASSESSMENT — ENCOUNTER SYMPTOMS
PALPITATIONS: 0
FEVER: 0
CHILLS: 0
SHORTNESS OF BREATH: 0

## 2024-11-15 NOTE — ASSESSMENT & PLAN NOTE
"Patient reports that she continues to have intermittent sensation of her \"heartbeating\" sensation in the left ear.  She also reports having a discomfort/pain behind the region of her left ear.  She reports that at times it will be better and then at times the pain will return.  "

## 2024-11-15 NOTE — ASSESSMENT & PLAN NOTE
Patient reports that she has been taking her blood pressure medications and her blood pressure has been well-controlled.

## 2024-11-15 NOTE — ASSESSMENT & PLAN NOTE
Patient reports that about 4 months ago she received a steroid injection to the left wrist.  She reports the pain has returned and she is going to schedule with her orthopedic to have another steroid injection.

## 2024-11-15 NOTE — PROGRESS NOTES
"Subjective:     CC: Follow-up on imaging    HPI:   Ms. Blankenship is a pleasant 74 yo who presents today for a follow-up visit.    Pulsatile tinnitus of left ear  Patient reports that she continues to have intermittent sensation of her \"heartbeating\" sensation in the left ear.  She also reports having a discomfort/pain behind the region of her left ear.  She reports that at times it will be better and then at times the pain will return.    Primary osteoarthritis of left wrist  Patient reports that about 4 months ago she received a steroid injection to the left wrist.  She reports the pain has returned and she is going to schedule with her orthopedic to have another steroid injection.    Essential hypertension  Patient reports that she has been taking her blood pressure medications and her blood pressure has been well-controlled.    Patient Active Problem List    Diagnosis Date Noted    Primary osteoarthritis of left wrist 05/21/2024    Psoriasis 03/27/2024    Leukoplakia of vulva 03/27/2024    Other headache syndrome 03/27/2024    Pulsatile tinnitus of left ear 03/27/2024    Left wrist pain 03/27/2024    Osteopenia after menopause 09/02/2021    Essential hypertension 10/20/2020    RBBB 10/20/2020    Benign colonic polyp 12/03/2007         Health Maintenance: Completed    ROS:  Review of Systems   Constitutional:  Negative for chills and fever.   Respiratory:  Negative for shortness of breath.    Cardiovascular:  Negative for chest pain and palpitations.       Objective:     Exam:  /72 (BP Location: Right arm, Patient Position: Sitting, BP Cuff Size: Adult)   Pulse 74   Temp 36.6 °C (97.9 °F)   Ht 1.575 m (5' 2\")   Wt 68 kg (150 lb)   SpO2 96%   BMI 27.44 kg/m²  Body mass index is 27.44 kg/m².    Physical Exam  Constitutional:       Appearance: Normal appearance.   Eyes:      Extraocular Movements: Extraocular movements intact.   Neurological:      Mental Status: She is alert.   Psychiatric:         Mood and " Affect: Mood normal.               Assessment & Plan:     73 y.o. female with the following -     1. Pulsatile tinnitus of both ears  Chronic, ongoing.  Unclear etiology at this time.  MR brain and IAC S with and without contrast was completed on 11/7/2024 with findings of mild supra tentorial white matter disease most consistent with microvascular ischemic change.  Unremarkable IACS.  No evidence of acoustic schwannoma.  No active inflammatory mastoid disease.  Patient continues to have the sensation of her heart beating in her left ear.  Also reports having pain in in the left side of her head.  I advised the patient to schedule an appointment ENT.  Referral for neurology has also been placed.  - Referral to Neurology    2. Essential hypertension  Chronic, controlled.  Continue valsartan-hydrochlorothiazide 80-12.5 mg daily.  - valsartan-hydrochlorothiazide (DIOVAN-HCT) 80-12.5 MG per tablet; Take 1 Tablet by mouth every day.  Dispense: 90 Tablet; Refill: 3    3. Primary osteoarthritis of left wrist  Chronic, ongoing.  Patient to schedule with her orthopedist to have repeat steroid injection to the left wrist.          Return if symptoms worsen or fail to improve.    Please note that this dictation was created using voice recognition software. I have made every reasonable attempt to correct obvious errors, but I expect that there are errors of grammar and possibly content that I did not discover before finalizing the note.

## 2024-12-03 ENCOUNTER — OFFICE VISIT (OUTPATIENT)
Dept: MEDICAL GROUP | Facility: PHYSICIAN GROUP | Age: 74
End: 2024-12-03
Payer: MEDICARE

## 2024-12-03 ENCOUNTER — TELEPHONE (OUTPATIENT)
Dept: MEDICAL GROUP | Facility: PHYSICIAN GROUP | Age: 74
End: 2024-12-03

## 2024-12-03 VITALS
HEIGHT: 62 IN | BODY MASS INDEX: 27.6 KG/M2 | DIASTOLIC BLOOD PRESSURE: 70 MMHG | WEIGHT: 150 LBS | HEART RATE: 71 BPM | SYSTOLIC BLOOD PRESSURE: 128 MMHG | TEMPERATURE: 99 F | OXYGEN SATURATION: 98 %

## 2024-12-03 DIAGNOSIS — R05.1 ACUTE COUGH: ICD-10-CM

## 2024-12-03 DIAGNOSIS — J02.9 SORE THROAT: ICD-10-CM

## 2024-12-03 DIAGNOSIS — J06.9 UPPER RESPIRATORY TRACT INFECTION, UNSPECIFIED TYPE: ICD-10-CM

## 2024-12-03 LAB — S PYO DNA SPEC NAA+PROBE: NOT DETECTED

## 2024-12-03 PROCEDURE — 3074F SYST BP LT 130 MM HG: CPT | Performed by: STUDENT IN AN ORGANIZED HEALTH CARE EDUCATION/TRAINING PROGRAM

## 2024-12-03 PROCEDURE — 3078F DIAST BP <80 MM HG: CPT | Performed by: STUDENT IN AN ORGANIZED HEALTH CARE EDUCATION/TRAINING PROGRAM

## 2024-12-03 PROCEDURE — 87651 STREP A DNA AMP PROBE: CPT | Performed by: STUDENT IN AN ORGANIZED HEALTH CARE EDUCATION/TRAINING PROGRAM

## 2024-12-03 PROCEDURE — 99213 OFFICE O/P EST LOW 20 MIN: CPT | Performed by: STUDENT IN AN ORGANIZED HEALTH CARE EDUCATION/TRAINING PROGRAM

## 2024-12-03 RX ORDER — AZITHROMYCIN 250 MG/1
TABLET, FILM COATED ORAL
Qty: 6 TABLET | Refills: 0 | Status: SHIPPED | OUTPATIENT
Start: 2024-12-03 | End: 2024-12-08

## 2024-12-03 RX ORDER — DEXTROMETHORPHAN HYDROBROMIDE AND PROMETHAZINE HYDROCHLORIDE 15; 6.25 MG/5ML; MG/5ML
5 SYRUP ORAL EVERY 4 HOURS PRN
Qty: 120 ML | Refills: 0 | Status: SHIPPED | OUTPATIENT
Start: 2024-12-03 | End: 2026-03-21

## 2024-12-03 ASSESSMENT — FIBROSIS 4 INDEX: FIB4 SCORE: 0.87

## 2024-12-03 ASSESSMENT — ENCOUNTER SYMPTOMS
FEVER: 0
SHORTNESS OF BREATH: 0
CHILLS: 0
PALPITATIONS: 0

## 2024-12-03 NOTE — TELEPHONE ENCOUNTER
Called pt to inform her she has an upper respiratory infection and to take the medication sent by dr SOTO.

## 2024-12-03 NOTE — PROGRESS NOTES
"Subjective:   Verbal consent was acquired by the patient to use [employer] ambient listening note generation during this visit Yes     CC: URI symptoms     History of Present Illness  Ms. Blankenship is a pleasant 73 yo who presents today for URI symptoms.    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 By Saturday, she felt unwell and spent the day in her robe. Her symptoms include a persistent cough, which kept her awake throughout the night, and chills accompanied by night sweats for two consecutive nights. She also reports difficulty breathing, producing green phlegm when she coughs, and a sore throat. She had a fever on Sunday and Monday and has been taking Tylenol for relief. She describes a sensation of pressure on her chest, as if someone is sitting on it.      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   Respiratory:  Negative for shortness of breath.    Cardiovascular:  Negative for chest pain and palpitations.       Objective:     Exam:  /70 (BP Location: Left arm, Patient Position: Sitting, BP Cuff Size: Adult)   Pulse 71   Temp 37.2 °C (99 °F)   Ht 1.575 m (5' 2\")   Wt 68 kg (150 lb)   SpO2 98%   BMI 27.44 kg/m²  Body mass index is 27.44 kg/m².    Physical Exam  Constitutional:       Appearance: Normal appearance.   HENT:      Right Ear: Tympanic membrane, ear canal and external ear normal.      Left Ear: Tympanic membrane, ear canal and " external ear normal.      Mouth/Throat:      Mouth: Mucous membranes are moist.      Pharynx: Pharyngeal swelling and posterior oropharyngeal erythema present.      Tonsils: No tonsillar exudate or tonsillar abscesses.   Cardiovascular:      Rate and Rhythm: Normal rate and regular rhythm.      Heart sounds: Normal heart sounds.   Pulmonary:      Effort: Pulmonary effort is normal. No respiratory distress.      Breath sounds: Normal breath sounds. No stridor. No wheezing, rhonchi or rales.   Neurological:      Mental Status: She is alert.           Assessment & Plan:     74 y.o. female with the following -     1. Upper respiratory tract infection, unspecified type  Acute, ongoing.  Will treat patient with Z-Lonnie.  If patient's rapid strep returns positive for strep throat then we will change medication to amoxicillin.  Patient to continue taking Tylenol/ibuprofen for fever/chills and bodyaches.  Patient was advised to drink warm tea with honey.  - azithromycin (ZITHROMAX) 250 MG Tab; Take 2 Tablets by mouth every day for 1 day, THEN 1 Tablet every day for 4 days.  Dispense: 6 Tablet; Refill: 0    2. Sore throat  Acute, ongoing.  On exam pharyngeal swelling and erythema present.  Will check for strep throat.  If strep throat returns positive we will switch antibiotics to amoxicillin.  - POCT CEPHEID GROUP A STREP - PCR    3. Acute cough  Acute, ongoing. Will send in for Promethazine-DM PRN for cough.  - promethazine-dextromethorphan (PROMETHAZINE-DM) 6.25-15 MG/5ML syrup; Take 5 mL by mouth every four hours as needed for Cough.  Dispense: 120 mL; Refill: 0    My total time spent caring for the patient on the day of the encounter was 26 minutes.   This does not include time spent on separately billable procedures/tests.      Return if symptoms worsen or fail to improve.    Please note that this dictation was created using voice recognition software. I have made every reasonable attempt to correct obvious errors, but I  expect that there are errors of grammar and possibly content that I did not discover before finalizing the note.

## 2024-12-16 ENCOUNTER — OFFICE VISIT (OUTPATIENT)
Dept: MEDICAL GROUP | Facility: PHYSICIAN GROUP | Age: 74
End: 2024-12-16
Payer: MEDICARE

## 2024-12-16 VITALS
OXYGEN SATURATION: 98 % | WEIGHT: 147.93 LBS | HEIGHT: 62 IN | HEART RATE: 69 BPM | SYSTOLIC BLOOD PRESSURE: 116 MMHG | TEMPERATURE: 97.7 F | DIASTOLIC BLOOD PRESSURE: 70 MMHG | BODY MASS INDEX: 27.22 KG/M2

## 2024-12-16 DIAGNOSIS — R06.02 SOB (SHORTNESS OF BREATH): ICD-10-CM

## 2024-12-16 DIAGNOSIS — R05.1 ACUTE COUGH: ICD-10-CM

## 2024-12-16 PROCEDURE — 3074F SYST BP LT 130 MM HG: CPT | Performed by: STUDENT IN AN ORGANIZED HEALTH CARE EDUCATION/TRAINING PROGRAM

## 2024-12-16 PROCEDURE — 3078F DIAST BP <80 MM HG: CPT | Performed by: STUDENT IN AN ORGANIZED HEALTH CARE EDUCATION/TRAINING PROGRAM

## 2024-12-16 PROCEDURE — 99213 OFFICE O/P EST LOW 20 MIN: CPT | Performed by: STUDENT IN AN ORGANIZED HEALTH CARE EDUCATION/TRAINING PROGRAM

## 2024-12-16 RX ORDER — ALBUTEROL SULFATE 90 UG/1
2 INHALANT RESPIRATORY (INHALATION) EVERY 4 HOURS PRN
Qty: 1 EACH | Refills: 0 | Status: SHIPPED | OUTPATIENT
Start: 2024-12-16

## 2024-12-16 RX ORDER — BENZONATATE 200 MG/1
200 CAPSULE ORAL 3 TIMES DAILY PRN
Qty: 60 CAPSULE | Refills: 0 | Status: SHIPPED | OUTPATIENT
Start: 2024-12-16

## 2024-12-16 ASSESSMENT — FIBROSIS 4 INDEX: FIB4 SCORE: 0.87

## 2024-12-16 NOTE — PROGRESS NOTES
"Subjective:     Chief Complaint   Patient presents with    Cough     Cough has not resolved since, not getting a full breath         History of Present Illness  The patient presents for evaluation of a persistent cough.    She reports a sensation of chest pressure, described as feeling like someone is sitting on her chest, accompanied by a persistent cough and ear congestion. She also experiences dyspnea, a symptom she has not previously encountered. Her symptoms began the day after Thanksgiving, despite no known exposure to sick individuals. She has been experiencing chest pressure and shortness of breath since her last visit with Dr. Novak on 12/3. She used over-the-counter cough drops. She does not have a humidifier at home. She was prescribed a Z-Lonnie and promethazine for her cough at her recent visit with Dr. Novak, which resulted in an approximate 50 to 60 percent improvement in her condition. She has been managing her symptoms with Tylenol, although she has not taken it for the past 5 days. She does not have any history of asthma and is not currently using any inhalers.    ALLERGIES  The patient is allergic to DOXYCYCLINE.      ROS:  Negative except as stated above.      Objective:     Exam:  /70   Pulse 69   Temp 36.5 °C (97.7 °F) (Temporal)   Ht 1.575 m (5' 2\")   Wt 67.1 kg (147 lb 14.9 oz)   SpO2 98%   BMI 27.06 kg/m²  Body mass index is 27.06 kg/m².    Physical Exam    Gen: Alert and oriented, no acute distress.  ENMT: TMs translucent with good landmarks bilaterally.  No erythema of ear canals.  No oropharyngeal erythema or exudate.  Neck: Neck is supple, trachea middle, no palpable lymphadenopathy.  Lungs: Normal effort, CTAB, no wheezing / rhonchi / rales.  CV: RRR, normal S1 and S2, no murmurs.      Assessment & Plan:     74 y.o. female with the following -     1. Acute cough  2. SOB (shortness of breath)  Acute.  She saw Dr. Novak on 12/3 and was treated with Z-lonnie and promethazine.  " Symptoms improved up to 60% after completing antibiotics.  A second course of antibiotics is not necessary at this time.  Advised NSAIDs for chest discomfort.  Prescribed tessalon perles as needed for cough and albuterol inhaler as needed for shortness of breat.  Humidifier recommended to alleviate symptoms.  If there is no improvement in her condition by the end of the week, she was instructed to message me via [employer].  - albuterol 108 (90 Base) MCG/ACT Aero Soln inhalation aerosol; Inhale 2 Puffs every four hours as needed for Shortness of Breath.  Dispense: 1 Each; Refill: 0        Return if symptoms worsen or fail to improve.    Verbal consent was acquired by the patient to use [employer] ambient listening note generation during this visit: Yes.    Please note that this dictation was created using voice recognition software. I have made every reasonable attempt to correct obvious errors, but I expect that there are errors of grammar and possibly content that I did not discover before finalizing the note.

## 2024-12-16 NOTE — Clinical Note
Hey! I saw your patient today for a sick visit.  You saw her on 12/3 and prescribed Z-javier and promethazine.  She's still complaining of cough and SOB.  I prescribed albuterol and tessalon perles.  I told her to message me in a few days if she's not feeling better.

## 2025-01-08 ENCOUNTER — APPOINTMENT (OUTPATIENT)
Dept: NEUROLOGY | Facility: MEDICAL CENTER | Age: 75
End: 2025-01-08
Attending: STUDENT IN AN ORGANIZED HEALTH CARE EDUCATION/TRAINING PROGRAM
Payer: MEDICARE

## 2025-02-26 ENCOUNTER — APPOINTMENT (OUTPATIENT)
Dept: NEUROLOGY | Facility: MEDICAL CENTER | Age: 75
End: 2025-02-26
Attending: SPECIALIST
Payer: MEDICARE